# Patient Record
Sex: FEMALE | Race: WHITE | Employment: FULL TIME | URBAN - METROPOLITAN AREA
[De-identification: names, ages, dates, MRNs, and addresses within clinical notes are randomized per-mention and may not be internally consistent; named-entity substitution may affect disease eponyms.]

---

## 2019-01-22 ENCOUNTER — OFFICE VISIT (OUTPATIENT)
Dept: INTERNAL MEDICINE CLINIC | Age: 29
End: 2019-01-22
Payer: COMMERCIAL

## 2019-01-22 VITALS
SYSTOLIC BLOOD PRESSURE: 116 MMHG | BODY MASS INDEX: 19.84 KG/M2 | DIASTOLIC BLOOD PRESSURE: 78 MMHG | OXYGEN SATURATION: 95 % | HEART RATE: 75 BPM | WEIGHT: 122 LBS

## 2019-01-22 DIAGNOSIS — F42.2 MIXED OBSESSIONAL THOUGHTS AND ACTS: ICD-10-CM

## 2019-01-22 DIAGNOSIS — F41.9 ANXIETY: ICD-10-CM

## 2019-01-22 DIAGNOSIS — J40 BRONCHITIS: Primary | ICD-10-CM

## 2019-01-22 PROCEDURE — 99214 OFFICE O/P EST MOD 30 MIN: CPT | Performed by: INTERNAL MEDICINE

## 2019-01-22 RX ORDER — AZITHROMYCIN 250 MG/1
250 TABLET, FILM COATED ORAL SEE ADMIN INSTRUCTIONS
Qty: 6 TABLET | Refills: 0 | Status: SHIPPED | OUTPATIENT
Start: 2019-01-22 | End: 2019-01-27

## 2019-01-22 ASSESSMENT — ENCOUNTER SYMPTOMS
CONSTIPATION: 0
COUGH: 0
SHORTNESS OF BREATH: 0
CHEST TIGHTNESS: 0
VOMITING: 0
WHEEZING: 0
ABDOMINAL DISTENTION: 0
BACK PAIN: 0
DIARRHEA: 0
NAUSEA: 0

## 2019-01-28 ENCOUNTER — OFFICE VISIT (OUTPATIENT)
Dept: PSYCHOLOGY | Age: 29
End: 2019-01-28
Payer: COMMERCIAL

## 2019-01-28 DIAGNOSIS — F42.2 MIXED OBSESSIONAL THOUGHTS AND ACTS: Primary | ICD-10-CM

## 2019-01-28 PROCEDURE — 90791 PSYCH DIAGNOSTIC EVALUATION: CPT | Performed by: SOCIAL WORKER

## 2019-02-15 ENCOUNTER — TELEPHONE (OUTPATIENT)
Dept: INTERNAL MEDICINE CLINIC | Age: 29
End: 2019-02-15

## 2019-03-06 ENCOUNTER — TELEPHONE (OUTPATIENT)
Dept: INTERNAL MEDICINE CLINIC | Age: 29
End: 2019-03-06

## 2019-03-15 ENCOUNTER — TELEPHONE (OUTPATIENT)
Dept: FAMILY MEDICINE CLINIC | Age: 29
End: 2019-03-15

## 2019-03-25 ENCOUNTER — NURSE ONLY (OUTPATIENT)
Dept: INTERNAL MEDICINE CLINIC | Age: 29
End: 2019-03-25

## 2019-04-19 ENCOUNTER — TELEPHONE (OUTPATIENT)
Dept: PSYCHIATRY | Age: 29
End: 2019-04-19

## 2019-04-19 NOTE — TELEPHONE ENCOUNTER
Lara Duane is calling regarding patient Jeanine Lopez. She  Is trying to help this young lady get an appointment.      Please call to advise

## 2019-05-01 NOTE — PROGRESS NOTES
PSYCHIATRY INITIAL EVALUATION/DIAGNOSTIC [address]  1990 05/02/19    Face to Face time: 25m  CC:   Chief Complaint   Patient presents with    New Patient     Patient is here for a consult, would like to address diagnosis. OCD      HPI:   Leonora Marquis is a 33 y.o. female with h/o OCD who p/t clinic to establish care with this provider. PCP is Lisa Guillory MD.     Referred by Thony Cruz and PCP. Mehul Ji recently. Saw Korina x1 and was referred to [facility] specialist.     Reports she has a long h/o anxiety. Has treated it with yoga/meditation until college when she approached her PCP and tried Zoloft which didn't help and gave her SI. Anxiety shows up in episodes, there are times when she has no anxiety. Currently in a year long episode with anxiety, obsessive thoughts and compulsions. Describes an \"obsession\" as worrying about if she forgot to put an injury on a report for a child at work. Formerly struggled with excessive showering (3-4x/day). Currently still obsesses over germs. Puts soap on her hands 4-5x when she washes, multiple times/day. A few months ago she had excessive showering. Spends a lot of time in the morning checking to make sure things are unplugged, lights/sinks are off, door is locked etc. Takes 15-20 minutes and she is late every day. NO intrusive thoughts. Had a depressive episode over the winter, but not currently. Denies depressed mood, normal energy/motivation/appetite/interests. Denies SI/HI, H/H. Sleep - sometimes doesn't sleep through the night, takes her 30 minutes to fall asleep. Has made some progress with therapist Kathryn Gitelman since February. Sees her 1x/week. OCD obsessions: germs, leaving for work (checking)    Denies h/o AVH, paranoia, silvia, PTSD, eating disorders.      context: office visit  severity: moderate  location: AMS / mood disturbance  associated symptoms: see above  modifiers: course of illness, stressors  duration: chronic    History obtained from patient and chart (confirmed by patient today). Past Psychiatric History:    Prior hospitalizations: Denies   Prior diagnoses: OCD   Prior medication trials/reactions to meds: Zoloft (SI, no effect), Luvox (no effect)   Outpatient Treatment: PCP, therapist Patrick Castro    Suicide Attempts: Denies      Substance Use History:   Nicotine:   Social History     Tobacco Use   Smoking Status Never Smoker   Smokeless Tobacco Never Used      Alcohol: Social   Illicits: Formerly THC until it made anxiety worse. Caffeine: 1 cup coffee/day. 1 cup tea/day   Rehabs/Complicated W/D: Denies, no DUIs     Past Medical/Surgical History:   Past Medical History:   Diagnosis Date    Anxiety     Fluttering heart     OCD (obsessive compulsive disorder)     Palpitations     Panic attacks     PVC's (premature ventricular contractions)     Rash      No past surgical history on file. PCP: Zoe Fonseca MD      Social/Developmental History:    Marital: Single   Children: None   Family:    Housing: With    Occupation/Income: , yoga classes   Education: In Yoga school              Shinto:    Legal hx: Denies   Abuse hx:   Violence hx:   Access to firearms: No    Family History:    Medical:  Family History   Problem Relation Age of Onset    Thyroid Disease Mother     Elevated Lipids Mother     Anxiety Disorder Father     High Blood Pressure Father       Psychiatric: Cousin - scz, cousin - BAD. History of completed suicide: Denies    Allergies: No Known Allergies      Current Medications:   No current outpatient medications on file. No current facility-administered medications for this visit. OBJECTIVE:  Vitals:   Vitals:    05/02/19 0818   BP: 126/76   Pulse: 84     Wt Readings from Last 3 Encounters:   05/02/19 126 lb 12.8 oz (57.5 kg)   01/22/19 122 lb (55.3 kg)   02/04/16 127 lb (57.6 kg)     Body mass index is 20.47 kg/m². Waist Circumference:   There were no vitals filed for this visit. ROS: Denies trouble with fever, rash, headache, vision changes, chest pain, shortness of breath, nausea, extremity pain, weakness, dysuria.      Mental Status Exam:     Appearance    alert, cooperative  Muscle strength/tone: no atrophy or abnormal movements, anti-gravity  Gait/station: normal, anti-gravity  Speech    spontaneous, normal rate, normal volume and well articulated  Mood    Anxious  Affect    anxiety Congruent to thought content and mood  Thought Content    intact, no delusions voiced  Thought Process    linear, goal directed and coherent   Associations    logical connections  Perceptions: denies AH/VH, does not appear preoccupied with the internal environment  Insight    Fair  Judgment    Intact  Orientation    oriented to person, place, time, and general circumstances  Memory    recent and remote memory intact  Attention/Concentration    intact  Ability to understand instructions Yes  Ability to respond meaningfully Yes  SI:   no suicidal ideation  HI: Denies HI    Labs:   No results found for: NA, K, CL, CO2, BUN, CREATININE, GLUCOSE, CALCIUM   No results found for: WBC, HGB, HCT, MCV, PLT  Lab Results   Component Value Date    COLORU Yellow 02/04/2016    GLUCOSEU Neg 02/04/2016    KETUA Neg 02/04/2016    BILIRUBINUR Neg 02/04/2016     No results found for: LABA1C  No results found for: EAG  No results found for: CHOL  No results found for: TRIG  No results found for: HDL  No results found for: LDLCALC, LDLCHOLESTEROL  No results found for: LABVLDL, VLDL  No results found for: CHOLHDLRATIO  Lab Results   Component Value Date    TSH 2.27 12/28/2011     No results found for: JCHBMLP6C2  No results found for: [id number]  No results found for: FOLATE        Imaging: no head imaging on file    Axis I  OCD, 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 *  Resolved Problems:    * No resolved hospital problems. *       Axis IV  no problems    ASSESSMENT AND PLAN      1. Safety: NO Imminent risk of danger to/self/others based on the factors considered below. Appropriate for outpatient level of care. Safety plan includes: 911, PES, hotlines, and interventions discussed today. Risk factors: mood disorder, comorbid anxiety  Protective factors: Age >24 and <55, female gender, denies current depression, denies suicidal ideation, does not have lethal plan, does not have access to guns or weapons, patient is chico for safety, no prior suicide attempts, no family h/o suicide, no substance abuse, patient has social or family support, no active psychosis or cognitive dysfunction, physically healthy, already has outpatient services in place, compliant with recommended medications, and patient is future oriented. 2. Psychiatric  -Genesight results. Multiple interactions, most of which show a need for lower doses, may still be able to use some of them. -START Pristiq 50mg qAM   -Labs: reviewed in Epic, ordered  -Continue therapy with [address] reviewed, c/w history  -R/b/se/a d/w pt who consents. 3. Medical  -Following with Masood Nuno MD    4. Substance   -See above    5. RTC - 4 weeks    Karla Yang M.D.   Psychiatrist

## 2019-05-02 ENCOUNTER — OFFICE VISIT (OUTPATIENT)
Dept: PSYCHIATRY | Age: 29
End: 2019-05-02
Payer: COMMERCIAL

## 2019-05-02 VITALS
BODY MASS INDEX: 20.38 KG/M2 | HEIGHT: 66 IN | HEART RATE: 84 BPM | SYSTOLIC BLOOD PRESSURE: 126 MMHG | WEIGHT: 126.8 LBS | DIASTOLIC BLOOD PRESSURE: 76 MMHG

## 2019-05-02 DIAGNOSIS — F33.42 RECURRENT MAJOR DEPRESSIVE DISORDER, IN FULL REMISSION (HCC): ICD-10-CM

## 2019-05-02 DIAGNOSIS — F42.2 MIXED OBSESSIONAL THOUGHTS AND ACTS: ICD-10-CM

## 2019-05-02 DIAGNOSIS — F42.2 MIXED OBSESSIONAL THOUGHTS AND ACTS: Primary | ICD-10-CM

## 2019-05-02 LAB
ALBUMIN SERPL-MCNC: 4.3 G/DL (ref 3.4–5)
ALP BLD-CCNC: 54 U/L (ref 40–129)
ALT SERPL-CCNC: 18 U/L (ref 10–40)
ANION GAP SERPL CALCULATED.3IONS-SCNC: 10 MMOL/L (ref 3–16)
AST SERPL-CCNC: 19 U/L (ref 15–37)
BASOPHILS ABSOLUTE: 0.1 K/UL (ref 0–0.2)
BASOPHILS RELATIVE PERCENT: 0.7 %
BILIRUB SERPL-MCNC: <0.2 MG/DL (ref 0–1)
BILIRUBIN DIRECT: <0.2 MG/DL (ref 0–0.3)
BILIRUBIN, INDIRECT: NORMAL MG/DL (ref 0–1)
BUN BLDV-MCNC: 13 MG/DL (ref 7–20)
CALCIUM SERPL-MCNC: 9.1 MG/DL (ref 8.3–10.6)
CHLORIDE BLD-SCNC: 107 MMOL/L (ref 99–110)
CO2: 23 MMOL/L (ref 21–32)
CREAT SERPL-MCNC: 0.7 MG/DL (ref 0.6–1.1)
EOSINOPHILS ABSOLUTE: 0.1 K/UL (ref 0–0.6)
EOSINOPHILS RELATIVE PERCENT: 1.5 %
FOLATE: 5.07 NG/ML (ref 4.78–24.2)
GFR AFRICAN AMERICAN: >60
GFR NON-AFRICAN AMERICAN: >60
GLUCOSE BLD-MCNC: 85 MG/DL (ref 70–99)
HCG(URINE) PREGNANCY TEST: NEGATIVE
HCT VFR BLD CALC: 37.8 % (ref 36–48)
HEMOGLOBIN: 12.6 G/DL (ref 12–16)
LYMPHOCYTES ABSOLUTE: 1.4 K/UL (ref 1–5.1)
LYMPHOCYTES RELATIVE PERCENT: 13.9 %
MCH RBC QN AUTO: 29.4 PG (ref 26–34)
MCHC RBC AUTO-ENTMCNC: 33.4 G/DL (ref 31–36)
MCV RBC AUTO: 88.1 FL (ref 80–100)
MONOCYTES ABSOLUTE: 0.8 K/UL (ref 0–1.3)
MONOCYTES RELATIVE PERCENT: 8.3 %
NEUTROPHILS ABSOLUTE: 7.6 K/UL (ref 1.7–7.7)
NEUTROPHILS RELATIVE PERCENT: 75.6 %
PDW BLD-RTO: 12.9 % (ref 12.4–15.4)
PLATELET # BLD: 234 K/UL (ref 135–450)
PMV BLD AUTO: 9.2 FL (ref 5–10.5)
POTASSIUM SERPL-SCNC: 4.2 MMOL/L (ref 3.5–5.1)
RBC # BLD: 4.29 M/UL (ref 4–5.2)
SODIUM BLD-SCNC: 140 MMOL/L (ref 136–145)
TOTAL PROTEIN: 7 G/DL (ref 6.4–8.2)
TSH REFLEX: 1.9 UIU/ML (ref 0.27–4.2)
VITAMIN B-12: 395 PG/ML (ref 211–911)
WBC # BLD: 10 K/UL (ref 4–11)

## 2019-05-02 PROCEDURE — 99204 OFFICE O/P NEW MOD 45 MIN: CPT | Performed by: PSYCHIATRY & NEUROLOGY

## 2019-05-02 RX ORDER — DESVENLAFAXINE 50 MG/1
50 TABLET, EXTENDED RELEASE ORAL DAILY
Qty: 30 TABLET | Refills: 1 | Status: SHIPPED | OUTPATIENT
Start: 2019-05-02 | End: 2019-06-03

## 2019-05-31 NOTE — PROGRESS NOTES
PSYCHIATRY PROGRESS NOTE    Benjamin Cheng  1990 06/03/19    Face to Face time: 12m  CC:   Chief Complaint   Patient presents with    Follow-up     Patient is here for a follow up. OCD      HPI:   Benjamin Cheng is a 33 y.o. female with h/o OCD who p/t clinic to establish care with this provider. PCP is Bishop Lopez MD.     Interim: no major events per chart    Reports she is doing well. Has noticed a significant improvement in anxiety. Continues to c/o compulsions but less intense and less frequent. Easier to calm down if she avoids a compulsion. Obsessive thoughts are less intense. Continues to wash her hands frequently, cleans obsessively at work, double checks things often. Leaving home has become easier. No longer checking the front door frequently or checking the computer. Generalized anxiety lessened as well. Continues to deny depressed mood or irritability. No SI/HI, AVH. Med compliant. Endorses some worsened sleep on the medication - trouble staying asleep. Typical OCD obsessions: germs, leaving for work (checking)    context: office visit  severity: moderate  location: AMS / mood disturbance  associated symptoms: see above  modifiers: course of illness, stressors  duration: chronic    History obtained from patient and chart (confirmed by patient today). Past Psychiatric History:    Prior hospitalizations: Denies   Prior diagnoses: OCD   Prior medication trials/reactions to meds: Zoloft (SI, no effect), Luvox (no effect)   Outpatient Treatment: PCP, therapist Mariia Angulo    Suicide Attempts: Denies      Substance Use History:   Nicotine:   Social History     Tobacco Use   Smoking Status Never Smoker   Smokeless Tobacco Never Used      Alcohol: Social   Illicits: Formerly THC until it made anxiety worse. Caffeine: 1 cup coffee/day.  1 cup tea/day   Rehabs/Complicated W/D: Denies, no DUIs     Past Medical/Surgical History:   Past Medical History:   Diagnosis Date    Anxiety     problems    ASSESSMENT AND PLAN      1. Safety: NO Imminent risk of danger to/self/others based on the factors considered below. Appropriate for outpatient level of care. Safety plan includes: 911, PES, hotlines, and interventions discussed today. Risk factors: mood disorder, comorbid anxiety  Protective factors: Age >24 and <55, female gender, denies current depression, denies suicidal ideation, does not have lethal plan, does not have access to guns or weapons, patient is chico for safety, no prior suicide attempts, no family h/o suicide, no substance abuse, patient has social or family support, no active psychosis or cognitive dysfunction, physically healthy, already has outpatient services in place, compliant with recommended medications, and patient is future oriented. 2. Psychiatric  -Genesight reviewed. Multiple interactions, most of which show a need for lower doses, may still be able to use some of them. -INCREASE Pristiq to 100mg qAM   -Can consider sleep aid but pt prefers to wait.   -Labs: reviewed in Epic  -Continue therapy with [address] reviewed, c/w history  -R/b/se/a d/w pt who consents. 3. Medical  -Following with Lala Ahumada, MD    4. Substance   -See above    5. RTC - 4 weeks    Jose Armando Fink M.D.   Psychiatrist

## 2019-06-03 ENCOUNTER — OFFICE VISIT (OUTPATIENT)
Dept: PSYCHIATRY | Age: 29
End: 2019-06-03
Payer: COMMERCIAL

## 2019-06-03 VITALS
WEIGHT: 123.4 LBS | HEART RATE: 89 BPM | HEIGHT: 66 IN | SYSTOLIC BLOOD PRESSURE: 100 MMHG | BODY MASS INDEX: 19.83 KG/M2 | DIASTOLIC BLOOD PRESSURE: 72 MMHG

## 2019-06-03 DIAGNOSIS — F33.42 RECURRENT MAJOR DEPRESSIVE DISORDER, IN FULL REMISSION (HCC): ICD-10-CM

## 2019-06-03 DIAGNOSIS — F42.2 MIXED OBSESSIONAL THOUGHTS AND ACTS: Primary | ICD-10-CM

## 2019-06-03 PROCEDURE — 99214 OFFICE O/P EST MOD 30 MIN: CPT | Performed by: PSYCHIATRY & NEUROLOGY

## 2019-06-03 RX ORDER — DESVENLAFAXINE 100 MG/1
100 TABLET, EXTENDED RELEASE ORAL DAILY
Qty: 30 TABLET | Refills: 1 | Status: SHIPPED | OUTPATIENT
Start: 2019-06-03 | End: 2019-07-01 | Stop reason: SDUPTHER

## 2019-06-28 NOTE — PROGRESS NOTES
problems. *       Axis IV  no problems    ASSESSMENT AND PLAN      1. Safety: NO Imminent risk of danger to/self/others based on the factors considered below. Appropriate for outpatient level of care. Safety plan includes: 911, PES, hotlines, and interventions discussed today. Risk factors: mood disorder, comorbid anxiety  Protective factors: Age >24 and <55, female gender, denies current depression, denies suicidal ideation, does not have lethal plan, does not have access to guns or weapons, patient is chico for safety, no prior suicide attempts, no family h/o suicide, no substance abuse, patient has social or family support, no active psychosis or cognitive dysfunction, physically healthy, already has outpatient services in place, compliant with recommended medications, and patient is future oriented. 2. Psychiatric  -Genesight reviewed. Multiple interactions, most of which show a need for lower doses, may still be able to use some of them. -Continue Pristiq 100mg qAM   -Can consider sleep aid but pt prefers to wait. Plans to try melatonin OTC.   -Labs: reviewed in Epic  -Continue therapy with [address] reviewed, c/w history  -R/b/se/a d/w pt who consents. 3. Medical  -Following with Nae Jones MD    4. Substance   -See above    5. RTC - 6 months    Desirae Oquendo M.D.   Psychiatrist

## 2019-07-01 ENCOUNTER — OFFICE VISIT (OUTPATIENT)
Dept: PSYCHIATRY | Age: 29
End: 2019-07-01
Payer: COMMERCIAL

## 2019-07-01 VITALS
DIASTOLIC BLOOD PRESSURE: 78 MMHG | BODY MASS INDEX: 19.48 KG/M2 | HEIGHT: 66 IN | WEIGHT: 121.2 LBS | HEART RATE: 88 BPM | SYSTOLIC BLOOD PRESSURE: 118 MMHG

## 2019-07-01 DIAGNOSIS — F33.42 RECURRENT MAJOR DEPRESSIVE DISORDER, IN FULL REMISSION (HCC): ICD-10-CM

## 2019-07-01 DIAGNOSIS — F42.2 MIXED OBSESSIONAL THOUGHTS AND ACTS: Primary | ICD-10-CM

## 2019-07-01 PROCEDURE — 99214 OFFICE O/P EST MOD 30 MIN: CPT | Performed by: PSYCHIATRY & NEUROLOGY

## 2019-07-01 RX ORDER — DESVENLAFAXINE 100 MG/1
100 TABLET, EXTENDED RELEASE ORAL DAILY
Qty: 90 TABLET | Refills: 1 | Status: SHIPPED | OUTPATIENT
Start: 2019-07-01 | End: 2019-11-04 | Stop reason: SDUPTHER

## 2019-11-01 ENCOUNTER — TELEPHONE (OUTPATIENT)
Dept: PSYCHIATRY | Age: 29
End: 2019-11-01

## 2019-11-01 NOTE — TELEPHONE ENCOUNTER
Pt calling states has new pharmacy needs script of   desvenlafaxine succinate (PRISTIQ) 100 MG TB24 extended release tablet   Sent to [facility] on file   Please call

## 2019-11-04 RX ORDER — DESVENLAFAXINE 100 MG/1
100 TABLET, EXTENDED RELEASE ORAL DAILY
Qty: 90 TABLET | Refills: 1 | Status: SHIPPED | OUTPATIENT
Start: 2019-11-04 | End: 2020-04-16 | Stop reason: SDUPTHER

## 2020-01-06 ENCOUNTER — CLINICAL DOCUMENTATION (OUTPATIENT)
Dept: PSYCHIATRY | Age: 30
End: 2020-01-06

## 2020-04-07 ENCOUNTER — TELEPHONE (OUTPATIENT)
Dept: PSYCHIATRY | Age: 30
End: 2020-04-07

## 2020-04-16 ENCOUNTER — TELEPHONE (OUTPATIENT)
Dept: PSYCHIATRY | Age: 30
End: 2020-04-16

## 2020-04-16 RX ORDER — DESVENLAFAXINE 100 MG/1
100 TABLET, EXTENDED RELEASE ORAL DAILY
Qty: 90 TABLET | Refills: 0 | Status: SHIPPED | OUTPATIENT
Start: 2020-04-16 | End: 2020-05-19 | Stop reason: SDUPTHER

## 2020-05-18 ENCOUNTER — TELEPHONE (OUTPATIENT)
Dept: INTERNAL MEDICINE CLINIC | Age: 30
End: 2020-05-18

## 2020-05-19 RX ORDER — DESVENLAFAXINE 100 MG/1
100 TABLET, EXTENDED RELEASE ORAL DAILY
Qty: 90 TABLET | Refills: 0 | Status: SHIPPED | OUTPATIENT
Start: 2020-05-19 | End: 2020-08-16

## 2020-05-19 NOTE — TELEPHONE ENCOUNTER
Refill request for desvenlafaxine medication.      [facility]    Last visit - 1/22/19     Pending visit - none    Last refill -4/16/20    Medication Contract signed -   Last Oarrs ran- 71/20    Additional Comments

## 2020-05-22 ENCOUNTER — VIRTUAL VISIT (OUTPATIENT)
Dept: INTERNAL MEDICINE CLINIC | Age: 30
End: 2020-05-22
Payer: COMMERCIAL

## 2020-05-22 PROCEDURE — 99212 OFFICE O/P EST SF 10 MIN: CPT | Performed by: NURSE PRACTITIONER

## 2020-05-22 ASSESSMENT — ENCOUNTER SYMPTOMS
COUGH: 0
NAUSEA: 0
VOMITING: 0
WHEEZING: 0
DIARRHEA: 0
EYE DISCHARGE: 0
CONSTIPATION: 0
ABDOMINAL PAIN: 0
SHORTNESS OF BREATH: 0
BLOOD IN STOOL: 0

## 2020-08-07 ENCOUNTER — OFFICE VISIT (OUTPATIENT)
Dept: PRIMARY CARE CLINIC | Age: 30
End: 2020-08-07
Payer: COMMERCIAL

## 2020-08-07 PROCEDURE — 99211 OFF/OP EST MAY X REQ PHY/QHP: CPT | Performed by: NURSE PRACTITIONER

## 2020-08-09 LAB — SARS-COV-2, NAA: NOT DETECTED

## 2020-08-16 RX ORDER — DESVENLAFAXINE 100 MG/1
TABLET, EXTENDED RELEASE ORAL
Qty: 30 TABLET | Refills: 2 | Status: SHIPPED | OUTPATIENT
Start: 2020-08-16 | End: 2020-11-25 | Stop reason: SDUPTHER

## 2020-11-25 ENCOUNTER — OFFICE VISIT (OUTPATIENT)
Dept: INTERNAL MEDICINE CLINIC | Age: 30
End: 2020-11-25
Payer: COMMERCIAL

## 2020-11-25 VITALS
OXYGEN SATURATION: 98 % | TEMPERATURE: 98.4 F | HEART RATE: 116 BPM | DIASTOLIC BLOOD PRESSURE: 68 MMHG | SYSTOLIC BLOOD PRESSURE: 102 MMHG | WEIGHT: 127 LBS | BODY MASS INDEX: 20.5 KG/M2

## 2020-11-25 PROCEDURE — 99213 OFFICE O/P EST LOW 20 MIN: CPT | Performed by: INTERNAL MEDICINE

## 2020-11-25 RX ORDER — DESVENLAFAXINE 100 MG/1
TABLET, EXTENDED RELEASE ORAL
Qty: 30 TABLET | Refills: 11 | Status: SHIPPED | OUTPATIENT
Start: 2020-11-25 | End: 2021-10-25 | Stop reason: SDUPTHER

## 2020-11-25 ASSESSMENT — ENCOUNTER SYMPTOMS
CHEST TIGHTNESS: 0
SHORTNESS OF BREATH: 0
BACK PAIN: 0
WHEEZING: 0
COUGH: 0
NAUSEA: 0
VOMITING: 0
DIARRHEA: 0
ABDOMINAL DISTENTION: 0
CONSTIPATION: 0

## 2020-11-25 NOTE — PROGRESS NOTES
11/25/20    Tucker Kayser  1990      Chief Complaint   Patient presents with    Depression       HPI    Here for f/u anxiety and OCD. Pt feels well. Feels her anxiety and OCD sxs improved on Pristiq. States if she misses a day of her medication, she become irritable. Denies SI    Review of Systems   Constitutional: Negative for unexpected weight change. Respiratory: Negative for cough, chest tightness, shortness of breath and wheezing. Cardiovascular: Negative for chest pain, palpitations and leg swelling. Gastrointestinal: Negative for abdominal distention, constipation, diarrhea, nausea and vomiting. Musculoskeletal: Negative for arthralgias, back pain and myalgias. Neurological: Negative for tremors and numbness. Psychiatric/Behavioral: Positive for decreased concentration. The patient is nervous/anxious. All other systems reviewed and are negative. Current Outpatient Medications   Medication Sig Dispense Refill    desvenlafaxine succinate (PRISTIQ) 100 MG TB24 extended release tablet TAKE 1 TABLET BY MOUTH EVERY DAY 30 tablet 11     No current facility-administered medications for this visit. Physical Exam  Vitals signs reviewed. Constitutional:       General: She is not in acute distress. Appearance: Normal appearance. She is normal weight. She is not ill-appearing. Neck:      Musculoskeletal: Normal range of motion. Cardiovascular:      Rate and Rhythm: Normal rate and regular rhythm. Heart sounds: No murmur. No friction rub. No gallop. Pulmonary:      Effort: Pulmonary effort is normal.      Breath sounds: Normal breath sounds. No wheezing, rhonchi or rales. Chest:      Chest wall: No tenderness. Neurological:      General: No focal deficit present. Mental Status: She is alert and oriented to person, place, and time. Psychiatric:         Mood and Affect: Mood normal.         Behavior: Behavior normal.         Thought Content:  Thought content normal.         Judgment: Judgment normal.         Vitals:    11/25/20 0908   BP: 102/68   Pulse: 116   Temp: 98.4 °F (36.9 °C)   SpO2: 98%         ASSESSMENT/PLAN:  1. Anxiety  Improved. Continue to monitor      2. Mixed obsessional thoughts and acts  Improved.  Continue to monitor

## 2020-12-09 ENCOUNTER — OFFICE VISIT (OUTPATIENT)
Dept: PRIMARY CARE CLINIC | Age: 30
End: 2020-12-09
Payer: COMMERCIAL

## 2020-12-09 PROCEDURE — 99211 OFF/OP EST MAY X REQ PHY/QHP: CPT | Performed by: NURSE PRACTITIONER

## 2020-12-09 NOTE — PROGRESS NOTES
Nickgarry Romero received a viral test for COVID-19. They were educated on isolation and quarantine as appropriate. For any symptoms, they were directed to seek care from their PCP, given contact information to establish with a doctor, directed to an urgent care or the emergency room.

## 2020-12-09 NOTE — PATIENT INSTRUCTIONS
Advance Care Planning  People with COVID-19 may have no symptoms, mild symptoms, such as fever, cough, and shortness of breath or they may have more severe illness, developing severe and fatal pneumonia. As a result, Advance Care Planning with attention to naming a health care decision maker (someone you trust to make healthcare decisions for you if you could not speak for yourself) and sharing other health care preferences is important BEFORE a possible health crisis. Please contact your Primary Care Provider to discuss Advance Care Planning. Preventing the Spread of Coronavirus Disease 2019 in Homes and Residential Communities  For the most recent information go to [email or web address]    Prevention steps for People with confirmed or suspected COVID-19 (including persons under investigation) who do not need to be hospitalized  and   People with confirmed COVID-19 who were hospitalized and determined to be medically stable to go home    Your healthcare provider and public health staff will evaluate whether you can be cared for at home. If it is determined that you do not need to be hospitalized and can be isolated at home, you will be monitored by staff from your local or state health department. You should follow the prevention steps below until a healthcare provider or local or state health department says you can return to your normal activities. Stay home except to get medical care  People who are mildly ill with COVID-19 are able to isolate at home during their illness. You should restrict activities outside your home, except for getting medical care. Do not go to work, school, or public areas. Avoid using public transportation, ride-sharing, or taxis. Separate yourself from other people and animals in your home  People: As much as possible, you should stay in a specific room and away from other people in your home.  Also, you should use a separate have a medical emergency and need to call 911, notify the dispatch personnel that you have, or are being evaluated for COVID-19. If possible, put on a facemask before emergency medical services arrive. Discontinuing home isolation  Patients with confirmed COVID-19 should remain under home isolation precautions until the risk of secondary transmission to others is thought to be low. The decision to discontinue home isolation precautions should be made on a case-by-case basis, in consultation with healthcare providers and state and local health departments.

## 2020-12-10 LAB — SARS-COV-2, NAA: DETECTED

## 2021-10-13 ENCOUNTER — TELEPHONE (OUTPATIENT)
Dept: INTERNAL MEDICINE CLINIC | Age: 31
End: 2021-10-13

## 2021-10-13 NOTE — TELEPHONE ENCOUNTER
Unable to leave a voice mail message for patient so we sent a my chart message letting her know that we have cancelled her 11/24/21 appointment with Dr. Audrey Syed since she has left the practice. Instructed patient to call the office so that we can discuss her options in locating a new provider. If she stays with [facility] please reschedule her 11/24/21 Appt.

## 2021-10-19 ENCOUNTER — NURSE TRIAGE (OUTPATIENT)
Dept: OTHER | Facility: CLINIC | Age: 31
End: 2021-10-19

## 2021-10-19 NOTE — TELEPHONE ENCOUNTER
Received call from Wilbur Tillman  at [facility] with Red Flag Complaint. Brief description of triage: 35year old  With  2   Positive pregnancy, reports some spotting of dark red blood 2 days       Triage indicates for patient to  Seen      Care advice provided, patient verbalizes understanding; denies any other questions or concerns; instructed to call back for any new or worsening symptoms. Writer provided warm transfer to [place]    at [facility] for appointment scheduling. Attention Provider: Thank you for allowing me to participate in the care of your patient. The patient was connected to triage in response to information provided to the ECC/PSC. Please do not respond through this encounter as the response is not directed to a shared pool. Reason for Disposition   Patient wants to be seen    Answer Assessment - Initial Assessment Questions  1. ONSET: \"When did this bleeding start? \"        2 days ago     2. DESCRIPTION: \"Describe the bleeding that you are having. \" \"How much bleeding is there? \"     - SPOTTING: spotting, or pinkish / brownish mucous discharge; does not fill panti-liner or pad     - MILD:  less than 1 pad / hour; less than patient's usual menstrual bleeding    - MODERATE: 1-2 pads / hour; small-medium blood clots (e.g., pea, grape, small coin)     - SEVERE: soaking 2 or more pads/hour for 2 or more hours; bleeding not contained by pads or continuous red blood from vagina; large blood clots (e.g., golf ball, large coin)     Spotting dark blood      3. ABDOMINAL PAIN SEVERITY: If present, ask: \"How bad is it? \"  (e.g., Scale 1-10; mild, moderate, or severe)    - MILD (1-3): doesn't interfere with normal activities, abdomen soft and not tender to touch     - MODERATE (4-7): interferes with normal activities or awakens from sleep, tender to touch     - SEVERE (8-10): excruciating pain, doubled over, unable to do any normal activities     Denies     4.  PREGNANCY: \"Do you know how many weeks or months pregnant you are?\" \"When was the first day of your last normal menstrual period? \"     LMP-  Sept 12      5. HEMODYNAMIC STATUS: \"Are you weak or feeling lightheaded? \" If so, ask: \"Can you stand and walk normally? \"       Denies     6. OTHER SYMPTOMS: \"What other symptoms are you having with the bleeding? \" (e.g., passed tissue, vaginal discharge, fever, menstrual-type cramps)     Dark red tissure    Protocols used: PREGNANCY - VAGINAL BLEEDING LESS THAN 20 WEEKS EGA-ADULT-OH

## 2021-10-22 NOTE — TELEPHONE ENCOUNTER
Attempted to call patient, unable to leave voice mail. Patient is scheduled for New Patient appt with [facility] on 10/25/21. We will remove PCP and assume patient is going with [facility].

## 2021-10-25 ENCOUNTER — OFFICE VISIT (OUTPATIENT)
Dept: FAMILY MEDICINE CLINIC | Age: 31
End: 2021-10-25
Payer: COMMERCIAL

## 2021-10-25 VITALS
TEMPERATURE: 98 F | WEIGHT: 133.6 LBS | BODY MASS INDEX: 21.47 KG/M2 | HEIGHT: 66 IN | DIASTOLIC BLOOD PRESSURE: 78 MMHG | OXYGEN SATURATION: 98 % | SYSTOLIC BLOOD PRESSURE: 110 MMHG | HEART RATE: 96 BPM | RESPIRATION RATE: 16 BRPM

## 2021-10-25 DIAGNOSIS — Z00.00 ANNUAL PHYSICAL EXAM: Primary | ICD-10-CM

## 2021-10-25 DIAGNOSIS — F42.2 MIXED OBSESSIONAL THOUGHTS AND ACTS: ICD-10-CM

## 2021-10-25 DIAGNOSIS — N93.8 DUB (DYSFUNCTIONAL UTERINE BLEEDING): ICD-10-CM

## 2021-10-25 DIAGNOSIS — N92.6 MISSED MENSES: ICD-10-CM

## 2021-10-25 LAB
BASOPHILS ABSOLUTE: 0 K/UL (ref 0–0.2)
BASOPHILS RELATIVE PERCENT: 0.3 %
CONTROL: NORMAL
EOSINOPHILS ABSOLUTE: 0 K/UL (ref 0–0.6)
EOSINOPHILS RELATIVE PERCENT: 0.1 %
HCT VFR BLD CALC: 37.6 % (ref 36–48)
HEMOGLOBIN: 12.6 G/DL (ref 12–16)
LYMPHOCYTES ABSOLUTE: 1.4 K/UL (ref 1–5.1)
LYMPHOCYTES RELATIVE PERCENT: 32.9 %
MCH RBC QN AUTO: 29.6 PG (ref 26–34)
MCHC RBC AUTO-ENTMCNC: 33.4 G/DL (ref 31–36)
MCV RBC AUTO: 88.4 FL (ref 80–100)
MONOCYTES ABSOLUTE: 0.4 K/UL (ref 0–1.3)
MONOCYTES RELATIVE PERCENT: 9.6 %
NEUTROPHILS ABSOLUTE: 2.3 K/UL (ref 1.7–7.7)
NEUTROPHILS RELATIVE PERCENT: 57.1 %
PDW BLD-RTO: 13.1 % (ref 12.4–15.4)
PLATELET # BLD: 212 K/UL (ref 135–450)
PMV BLD AUTO: 9.8 FL (ref 5–10.5)
PREGNANCY TEST URINE, POC: NEGATIVE
RBC # BLD: 4.25 M/UL (ref 4–5.2)
WBC # BLD: 4.1 K/UL (ref 4–11)

## 2021-10-25 PROCEDURE — 90471 IMMUNIZATION ADMIN: CPT | Performed by: PHYSICIAN ASSISTANT

## 2021-10-25 PROCEDURE — 99204 OFFICE O/P NEW MOD 45 MIN: CPT | Performed by: PHYSICIAN ASSISTANT

## 2021-10-25 PROCEDURE — 36415 COLL VENOUS BLD VENIPUNCTURE: CPT | Performed by: PHYSICIAN ASSISTANT

## 2021-10-25 PROCEDURE — 81025 URINE PREGNANCY TEST: CPT | Performed by: PHYSICIAN ASSISTANT

## 2021-10-25 PROCEDURE — 90686 IIV4 VACC NO PRSV 0.5 ML IM: CPT | Performed by: PHYSICIAN ASSISTANT

## 2021-10-25 PROCEDURE — 99385 PREV VISIT NEW AGE 18-39: CPT | Performed by: PHYSICIAN ASSISTANT

## 2021-10-25 RX ORDER — DESVENLAFAXINE 100 MG/1
TABLET, EXTENDED RELEASE ORAL
Qty: 90 TABLET | Refills: 1 | Status: SHIPPED | OUTPATIENT
Start: 2021-10-25 | End: 2022-05-17

## 2021-10-25 NOTE — PROGRESS NOTES
[address] EXAMINATION         Date of Exam: 10/25/2021    Patient's Name: Anika Lock   Patient's YOB: 1990       Chief Complaint   Patient presents with    New Patient     Pt is here to establish care, 4 positive pregnancy tests, 1 negative, couple days of bleeding ,pt is fasting       HPI: Anika Lock is a 35 y.o. female NEW to provider  who presents for a complete preventive health medical examination. OCD, Panic disorder, Depression. Used to see psychiatrist but since starting Pristiq, she has been very stable and does not feel she needs therapy or Psych. Thinks she is pregnant. LMP 10/12/21. Pos pregnancy home test 10/13/21 and began spotting 10 /17 for a few days. Repeat preg tests afterwards was pos then neg. . To see [address] in 2 days.  1 month ago today. REVIEW OF SYSTEMS:    Pertinent positive and negatives are in HPI. The remaining 14 ROS were reviewed and are unremarkable for other constitutional, EENT, cardiac, pulmonary, GI, , neurologic, musculoskeletal, or integumentary complaints. Last eye exam:    With DL  Hearing concerns: No  Last Dental exam:    long time. recommended  Caffeine use:  1-3/ day  Exercise:  Manage a . 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 6 Beers a week  Tobacco/ Vapping/ marijuana use:  no  Mental Health; concerns of anxiety or depression? yes -  Stable OCD on Pristiq  Perform routine skin checks? yes   Perform monthly self breast exams? No  Last Mammo:   not done   Last gyn exam:   >1 year  Going [place] [facility] [place] in 2 [place]. Colonoscopy:  No     Living will: no,           PAST MEDICAL HISTORY:      Diagnosis Date    Anxiety     Fluttering heart     Hives     OCD (obsessive compulsive disorder)     Palpitations     Panic attacks        History reviewed. No pertinent surgical history.     Family and Social History on file      ALLERGIES:    Penicillins    MEDICATIONS:  Current Outpatient Medications   Medication Sig Dispense Refill    desvenlafaxine succinate (PRISTIQ) 100 MG TB24 extended release tablet TAKE 1 TABLET BY MOUTH EVERY DAY 90 tablet 1     No current facility-administered medications for this visit. PHYSICAL EXAM:   Vitals:    10/25/21 0916   BP: 110/78   Site: Left Upper Arm   Position: Sitting   Pulse: 96   Resp: 16   Temp: 98 °F (36.7 °C)   TempSrc: Temporal   SpO2: 98%   Weight: 133 lb 9.6 oz (60.6 kg)   Height: 5' 6\" (1.676 m)       Body mass index is 21.56 kg/m². GENERAL APPEARANCE:  Well nourished. No distress. HEENT: Normocephalic. Atraumatic. EYES: Vision intact. EOMI, PERRLA. Conjunctivae pink, moist. Sclera white. Cornea and lens without opacities. EARS: Auricles symmetrical without lesions or deformities. Canals are clear. TM's intact bilaterally. Hearing  intact. NOSE: MOUTH/THROAT:  currently not examined. NECK: No lymphadenopathy. Thyroid smooth, not enlarged. Spine non-tender and full range of motion without pain. LUNGS: Clear to auscultation, no wheezes, rales, or rhonci. Equal resonance to chest percussion bilaterally. CHEST/SPINE: No skin changes or chest wall deformities. No CVAT. No spine or paraspinal muscle tenderness or deformities. Full range of motion in all planes. HEART:  Regular rate and rhythm. No murmurs, rubs, or gallops. VASCULAR:  No jugular venous distension or carotid bruits. Pulses 2+ and symmetrical to carotid, femoral, and dorsalis pedis. Capillary refill <3secs. ABDOMEN: Without scars. Soft, non-tender, normoactive bowel sounds. No pulsatile masses or hepatosplenomegaly. No injuinal nodes or hernias. BREAST:  GYN: deferred   EXTREMITIES: No skin or bony deformities. No new erythema, edema, or exudates. Symmetrical strength. Full range of motion without eliciting pain. Sensation and DTR's are equal and intact.  Vascular is intact. NEUROLOGIC: Grossly non focal. Cranial Nerves II-XII intact. Negative Rhomberg. SKIN: Warm, dry and intact. No rashes, petechia, purpura. No suspicious lesions. No nail clubbing or cyanosis. PSYCHIATRIC:  Answers and understands questions appropriately. Normal affect, behavior,  and mood. ADDITIONAL DATA:  Prior clinic visit notes, labs, and imaging reports were reviewed. Lipid panel: No results found for: TRIG, HDL, LDLCALC, LDLDIRECT     ASSESSMENT & PLAN:    Sakshi Treadwell was seen today for new patient. 1. Annual physical exam   --Medical records updated today. Instructed on routine monthly skin and breast exams. Discussed preventive health measures- COVID vax recommended, dentist.     2. Mixed obsessional thoughts and acts OCD  Stable on Pristiq for over a year. Does not feel therapy is needed due to stability. Refill meds. 3. DUB (dysfunctional uterine bleeding)     Missed menses   -Pregnancy test negative today despite home tests positive. May have miscarried last week due to multiple days of vag bleeding. OB- GYN appointment in 2 days.  -ck CBC due to excessive bleeding.      Follow Up yearly

## 2021-10-25 NOTE — PROGRESS NOTES
Vaccine Information Sheet, \"Influenza - Inactivated\"  given to Monalisa Rodgers, or parent/legal guardian of  Monalisa Rodgers and verbalized understanding. Patient responses:    Have you ever had a reaction to a flu vaccine? No  Do you have any current illness? No  Have you ever had [place] Syndrome? No  Do you have a serious allergy to any of the follow: Neomycin, Polymyxin, Thimerosal, eggs or egg products? No    Flu vaccine given per order. Please see immunization tab. Risks and benefits explained. Current VIS given.

## 2021-10-25 NOTE — PATIENT INSTRUCTIONS
Healthy Living Recommendations:  -Exercise 150 minutes/ week to include aerobic and weights.  -Food intake to include more plant based and whole grains. Avoid foods made with raw sugar such as candy, cookies, drinks with sugar. Avoid greasy, fried, and processed foods. Studies show that obesity, a diet high in red meat and processed foods, tobacco and alcohol abuse, and a sedentary lifestyle increase the risk of developing colorectal cancer.  -Eye exam every 2 years over age 36.   -Dental exam every 6 months.  -Colonoscopy at age 39      NEW to PROVIDER:     Please call your pharmacy  for medication refills. Please be sure to call our office if your illness/problem that has been treated has not completely resolved. Bring an accurate list of your medications with you at every appointment to ensure that we have the correct information. [address] [place] [address] office hours are: Monday - Friday 7 am- 5 pm. Phone lines turn on at 8 am. Office PH: [phone] 15 minutes prior to appointments.

## 2021-10-26 LAB
A/G RATIO: 2.1 (ref 1.1–2.2)
ALBUMIN SERPL-MCNC: 4.7 G/DL (ref 3.4–5)
ALP BLD-CCNC: 52 U/L (ref 40–129)
ALT SERPL-CCNC: 13 U/L (ref 10–40)
ANION GAP SERPL CALCULATED.3IONS-SCNC: 11 MMOL/L (ref 3–16)
AST SERPL-CCNC: 14 U/L (ref 15–37)
BILIRUB SERPL-MCNC: 0.3 MG/DL (ref 0–1)
BUN BLDV-MCNC: 11 MG/DL (ref 7–20)
CALCIUM SERPL-MCNC: 9.3 MG/DL (ref 8.3–10.6)
CHLORIDE BLD-SCNC: 103 MMOL/L (ref 99–110)
CHOLESTEROL, TOTAL: 196 MG/DL (ref 0–199)
CO2: 25 MMOL/L (ref 21–32)
CREAT SERPL-MCNC: 0.7 MG/DL (ref 0.6–1.1)
GFR AFRICAN AMERICAN: >60
GFR NON-AFRICAN AMERICAN: >60
GLOBULIN: 2.2 G/DL
GLUCOSE BLD-MCNC: 88 MG/DL (ref 70–99)
HDLC SERPL-MCNC: 74 MG/DL (ref 40–60)
LDL CHOLESTEROL CALCULATED: 112 MG/DL
POTASSIUM SERPL-SCNC: 4.1 MMOL/L (ref 3.5–5.1)
SODIUM BLD-SCNC: 139 MMOL/L (ref 136–145)
TOTAL PROTEIN: 6.9 G/DL (ref 6.4–8.2)
TRIGL SERPL-MCNC: 51 MG/DL (ref 0–150)
VLDLC SERPL CALC-MCNC: 10 MG/DL

## 2022-05-17 RX ORDER — DESVENLAFAXINE 100 MG/1
TABLET, EXTENDED RELEASE ORAL
Qty: 90 TABLET | Refills: 2 | Status: SHIPPED | OUTPATIENT
Start: 2022-05-17

## 2022-05-17 NOTE — TELEPHONE ENCOUNTER
Zacarias Canavan is requesting refill(s) Pristiq  Last OV 10-25-21 (pertaining to medication)  LR 10-25-21 #90 1  RF (per medication requested)  Next office visit scheduled or attempted No

## 2023-03-16 NOTE — TELEPHONE ENCOUNTER
LM for the pt to contact the office. Pt has not been seen in one calendar year and needs to schedule an appt.       Domi Crespo is requesting refill(s) pristiq  Last OV 10/25/21 (pertaining to medication)  LR 5/17/22 (per medication requested)  Next office visit scheduled or attempted No   If no, reason:  LM for the pt to schedule

## 2023-03-20 RX ORDER — DESVENLAFAXINE 100 MG/1
TABLET, EXTENDED RELEASE ORAL
Qty: 30 TABLET | Refills: 8 | OUTPATIENT
Start: 2023-03-20

## 2023-03-27 NOTE — PROGRESS NOTES
[address] EXAMINATION       3/29/2023       CC:    Chief Complaint   Patient presents with    Annual Exam     Pt here for physical exam. Pt has not fasted for bw. Medication Refill     Pt needs refill on pristiq 100 mg er tablet. HPI: Troy Travis 1990 is a 33 y.o. who presents for a complete preventive health medical examination. OCD, Panic disorder, Depression. Used to see psychiatrist but since starting Pristiq, she has been very stable and does not feel she needs therapy or Psych. On Pristiq for years. Last seen 2 yrs ago. PREVENTIVE HEALTH:   Last eye exam:    With DL  Hearing concerns: No  Last Dental exam:   to have wisdom teeth removed. 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 Works in [employer] and has a puppy. Diet: feels needs improvement healthy  Alcohol use: 4 per week. Tobacco/ Vapping/ marijuana use:  no  Mental Health; concerns of anxiety or depression? yes -  Stable OCD on Pristiq  Perform routine skin checks? yes   Perform monthly self breast exams? No  Last Mammo:   not done   Last gyn exam:   10/22 [address] [place] [address]    Colonoscopy:  No     Living will: no,           REVIEW OF SYSTEMS:    Pertinent positive and negatives are in HPI. The remaining 14 ROS were reviewed and are unremarkable for other constitutional, EENT, cardiac, pulmonary, GI, , neurologic, musculoskeletal, or integumentary complaints    PAST MEDICAL HISTORY:      Diagnosis Date    Anxiety     Fluttering heart     Hives     OCD (obsessive compulsive disorder)     Palpitations     Panic attacks      History reviewed. No pertinent surgical history. Social and Family reviewed.     ALLERGIES:    Penicillins    MEDICATIONS:  Current Outpatient Medications   Medication Sig Dispense Refill    desvenlafaxine succinate (PRISTIQ) 100 MG TB24 extended release tablet Take 1 tablet by mouth daily 90 tablet 1    Cranberry 180 MG CAPS Take

## 2023-03-29 ENCOUNTER — OFFICE VISIT (OUTPATIENT)
Dept: FAMILY MEDICINE CLINIC | Age: 33
End: 2023-03-29
Payer: COMMERCIAL

## 2023-03-29 VITALS
TEMPERATURE: 97.6 F | WEIGHT: 129 LBS | OXYGEN SATURATION: 99 % | SYSTOLIC BLOOD PRESSURE: 114 MMHG | HEIGHT: 66 IN | RESPIRATION RATE: 14 BRPM | BODY MASS INDEX: 20.73 KG/M2 | DIASTOLIC BLOOD PRESSURE: 74 MMHG | HEART RATE: 84 BPM

## 2023-03-29 DIAGNOSIS — F33.42 RECURRENT MAJOR DEPRESSIVE DISORDER, IN FULL REMISSION (HCC): ICD-10-CM

## 2023-03-29 DIAGNOSIS — Z00.00 ANNUAL PHYSICAL EXAM: Primary | ICD-10-CM

## 2023-03-29 DIAGNOSIS — F42.2 MIXED OBSESSIONAL THOUGHTS AND ACTS: ICD-10-CM

## 2023-03-29 LAB
ALBUMIN SERPL-MCNC: 4.6 G/DL (ref 3.4–5)
ALBUMIN/GLOB SERPL: 1.7 {RATIO} (ref 1.1–2.2)
ALP SERPL-CCNC: 66 U/L (ref 40–129)
ALT SERPL-CCNC: 36 U/L (ref 10–40)
ANION GAP SERPL CALCULATED.3IONS-SCNC: 14 MMOL/L (ref 3–16)
AST SERPL-CCNC: 21 U/L (ref 15–37)
BILIRUB SERPL-MCNC: 0.4 MG/DL (ref 0–1)
BUN SERPL-MCNC: 14 MG/DL (ref 7–20)
CALCIUM SERPL-MCNC: 9.3 MG/DL (ref 8.3–10.6)
CHLORIDE SERPL-SCNC: 103 MMOL/L (ref 99–110)
CO2 SERPL-SCNC: 22 MMOL/L (ref 21–32)
CREAT SERPL-MCNC: 0.7 MG/DL (ref 0.6–1.1)
GFR SERPLBLD CREATININE-BSD FMLA CKD-EPI: >60 ML/MIN/{1.73_M2}
GLUCOSE SERPL-MCNC: 89 MG/DL (ref 70–99)
POTASSIUM SERPL-SCNC: 4 MMOL/L (ref 3.5–5.1)
PROT SERPL-MCNC: 7.3 G/DL (ref 6.4–8.2)
SODIUM SERPL-SCNC: 139 MMOL/L (ref 136–145)

## 2023-03-29 PROCEDURE — 99395 PREV VISIT EST AGE 18-39: CPT | Performed by: PHYSICIAN ASSISTANT

## 2023-03-29 PROCEDURE — 36415 COLL VENOUS BLD VENIPUNCTURE: CPT | Performed by: PHYSICIAN ASSISTANT

## 2023-03-29 RX ORDER — DESVENLAFAXINE 100 MG/1
100 TABLET, EXTENDED RELEASE ORAL DAILY
Qty: 90 TABLET | Refills: 1 | Status: SHIPPED | OUTPATIENT
Start: 2023-03-29

## 2023-03-29 ASSESSMENT — PATIENT HEALTH QUESTIONNAIRE - PHQ9
9. THOUGHTS THAT YOU WOULD BE BETTER OFF DEAD, OR OF HURTING YOURSELF: 0
5. POOR APPETITE OR OVEREATING: 0
7. TROUBLE CONCENTRATING ON THINGS, SUCH AS READING THE NEWSPAPER OR WATCHING TELEVISION: 0
3. TROUBLE FALLING OR STAYING ASLEEP: 3
SUM OF ALL RESPONSES TO PHQ QUESTIONS 1-9: 3
4. FEELING TIRED OR HAVING LITTLE ENERGY: 0
1. LITTLE INTEREST OR PLEASURE IN DOING THINGS: 0
10. IF YOU CHECKED OFF ANY PROBLEMS, HOW DIFFICULT HAVE THESE PROBLEMS MADE IT FOR YOU TO DO YOUR WORK, TAKE CARE OF THINGS AT HOME, OR GET ALONG WITH OTHER PEOPLE: 0
SUM OF ALL RESPONSES TO PHQ QUESTIONS 1-9: 3
8. MOVING OR SPEAKING SO SLOWLY THAT OTHER PEOPLE COULD HAVE NOTICED. OR THE OPPOSITE, BEING SO FIGETY OR RESTLESS THAT YOU HAVE BEEN MOVING AROUND A LOT MORE THAN USUAL: 0
6. FEELING BAD ABOUT YOURSELF - OR THAT YOU ARE A FAILURE OR HAVE LET YOURSELF OR YOUR FAMILY DOWN: 0
2. FEELING DOWN, DEPRESSED OR HOPELESS: 0
SUM OF ALL RESPONSES TO PHQ9 QUESTIONS 1 & 2: 0

## 2023-03-29 NOTE — PATIENT INSTRUCTIONS
Healthy Living Recommendations:  Exercise 150 minutes/ week to include aerobic and weights. Body Mass Index (BMI) should be 25 or less. Nutrition : moderation in sodium/caffeine intake, saturated fat and cholesterol, caloric balance, sufficient intake of fresh fruits, vegetables, fiber, calcium, iron, and 1 mg of folate supplement per day (for females capable of pregnancy). :  Studies show diets high in red meat and processed foods, tobacco use, alcohol abuse, and a sedentary lifestyle WILL increase the risk heart and liver diseases, cancers, and dementia. Eye exam every 2 years over age 36. Dental  Perform  regular tooth brushing and flossing daily. Dentist exam every 6 months. Colonoscopy Begin at age 39    Females: perform monthly skin & self breast exams. Yearly mammograms begin age 36. Males: perform monthly skin exam and testicular exam. Urinary changes can be a sign of prostate issues. NEW to PROVIDER:   [facility]   [address] [facility], [address] [place] [address]  Office hours are: Monday - Friday 7 am- 5 pm. Phone lines turn on at 8 am.   Office Ginny [address]: [phone]   -Please call your pharmacy for medication refills. - Make follow up appointment if  illness/problem treated has not resolved. -Bring  your medications with you to every appointment to ensure that we have the correct information.  -Arrive 15 minutes prior to appointments.

## 2023-10-26 RX ORDER — DESVENLAFAXINE 100 MG/1
100 TABLET, EXTENDED RELEASE ORAL DAILY
Qty: 90 TABLET | Refills: 1 | Status: SHIPPED | OUTPATIENT
Start: 2023-10-26

## 2023-10-26 NOTE — TELEPHONE ENCOUNTER
Jonny Handler is requesting refill(s) pristiq  Last OV 3/29/23 (pertaining to medication)  LR 3/29/23 (per medication requested)  Next office visit scheduled or attempted No   If no, reason:  pt is due in March